# Patient Record
Sex: FEMALE | NOT HISPANIC OR LATINO | URBAN - METROPOLITAN AREA
[De-identification: names, ages, dates, MRNs, and addresses within clinical notes are randomized per-mention and may not be internally consistent; named-entity substitution may affect disease eponyms.]

---

## 2017-03-21 ENCOUNTER — EMERGENCY (EMERGENCY)
Facility: HOSPITAL | Age: 52
LOS: 1 days | Discharge: HOME | End: 2017-03-21
Attending: EMERGENCY MEDICINE | Admitting: EMERGENCY MEDICINE
Payer: COMMERCIAL

## 2017-03-21 VITALS
DIASTOLIC BLOOD PRESSURE: 64 MMHG | SYSTOLIC BLOOD PRESSURE: 107 MMHG | RESPIRATION RATE: 16 BRPM | HEART RATE: 102 BPM | OXYGEN SATURATION: 99 % | TEMPERATURE: 99 F

## 2017-03-21 VITALS
SYSTOLIC BLOOD PRESSURE: 131 MMHG | WEIGHT: 104.94 LBS | HEIGHT: 61 IN | RESPIRATION RATE: 16 BRPM | DIASTOLIC BLOOD PRESSURE: 84 MMHG | HEART RATE: 107 BPM | OXYGEN SATURATION: 100 % | TEMPERATURE: 99 F

## 2017-03-21 DIAGNOSIS — B97.81 HUMAN METAPNEUMOVIRUS AS THE CAUSE OF DISEASES CLASSIFIED ELSEWHERE: ICD-10-CM

## 2017-03-21 DIAGNOSIS — R06.02 SHORTNESS OF BREATH: ICD-10-CM

## 2017-03-21 PROCEDURE — 84702 CHORIONIC GONADOTROPIN TEST: CPT

## 2017-03-21 PROCEDURE — 83605 ASSAY OF LACTIC ACID: CPT

## 2017-03-21 PROCEDURE — 87040 BLOOD CULTURE FOR BACTERIA: CPT

## 2017-03-21 PROCEDURE — 87581 M.PNEUMON DNA AMP PROBE: CPT

## 2017-03-21 PROCEDURE — 99284 EMERGENCY DEPT VISIT MOD MDM: CPT

## 2017-03-21 PROCEDURE — 71010: CPT | Mod: 26

## 2017-03-21 PROCEDURE — 87486 CHLMYD PNEUM DNA AMP PROBE: CPT

## 2017-03-21 PROCEDURE — 99284 EMERGENCY DEPT VISIT MOD MDM: CPT | Mod: 25

## 2017-03-21 PROCEDURE — 80053 COMPREHEN METABOLIC PANEL: CPT

## 2017-03-21 PROCEDURE — 85027 COMPLETE CBC AUTOMATED: CPT

## 2017-03-21 PROCEDURE — 93005 ELECTROCARDIOGRAM TRACING: CPT

## 2017-03-21 PROCEDURE — 87633 RESP VIRUS 12-25 TARGETS: CPT

## 2017-03-21 PROCEDURE — 87798 DETECT AGENT NOS DNA AMP: CPT

## 2017-03-21 PROCEDURE — 94640 AIRWAY INHALATION TREATMENT: CPT

## 2017-03-21 PROCEDURE — 71045 X-RAY EXAM CHEST 1 VIEW: CPT

## 2017-03-21 RX ORDER — IBUPROFEN 200 MG
1 TABLET ORAL
Qty: 20 | Refills: 0 | OUTPATIENT
Start: 2017-03-21 | End: 2017-03-26

## 2017-03-21 RX ORDER — IPRATROPIUM/ALBUTEROL SULFATE 18-103MCG
3 AEROSOL WITH ADAPTER (GRAM) INHALATION ONCE
Qty: 0 | Refills: 0 | Status: COMPLETED | OUTPATIENT
Start: 2017-03-21 | End: 2017-03-21

## 2017-03-21 RX ORDER — SODIUM CHLORIDE 9 MG/ML
1000 INJECTION INTRAMUSCULAR; INTRAVENOUS; SUBCUTANEOUS ONCE
Qty: 0 | Refills: 0 | Status: COMPLETED | OUTPATIENT
Start: 2017-03-21 | End: 2017-03-21

## 2017-03-21 RX ORDER — ALBUTEROL 90 UG/1
2 AEROSOL, METERED ORAL
Qty: 1 | Refills: 0 | OUTPATIENT
Start: 2017-03-21 | End: 2017-03-31

## 2017-03-21 RX ADMIN — SODIUM CHLORIDE 1000 MILLILITER(S): 9 INJECTION INTRAMUSCULAR; INTRAVENOUS; SUBCUTANEOUS at 14:45

## 2017-03-21 RX ADMIN — Medication 3 MILLILITER(S): at 15:04

## 2017-03-21 NOTE — ED PROVIDER NOTE - OBJECTIVE STATEMENT
cough, sob, chills, chest tightness, body aches x 5 days, not sure if had fever.  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

## 2017-03-21 NOTE — ED PROVIDER NOTE - PROGRESS NOTE DETAILS
results discussed, copy of results given, rx fo albuterol, motrin, mucinex, sent to pharmacy.  follow up with pmd , return to ed if condition worsens

## 2017-03-21 NOTE — ED PROVIDER NOTE - ATTENDING CONTRIBUTION TO CARE
pt c/o 5 days of sob, productive cough, tactile fever, maylgias. no ha, cp, abd pain, d/n/v, edema, calf pain.  mmm, s1s2 rrr, lungs cta b/l, abd soft, non-tender, non-distended, skin warm and dry

## 2017-03-21 NOTE — ED PROVIDER NOTE - CARE PLAN
Principal Discharge DX:	Cough Principal Discharge DX:	Human metapneumovirus (hMPV) as cause of disease classified elsewhere

## 2017-03-21 NOTE — ED ADULT NURSE REASSESSMENT NOTE - NS ED NURSE REASSESS COMMENT FT1
Relief break RN.  Pt A&Ox4, NAD, ambulated to BR.  Fluids hung as ordered, pt started on neb treatment.

## 2017-03-21 NOTE — ED PROVIDER NOTE - CONSTITUTIONAL, MLM
normal... Weawake, alert, oriented to person, place, time/situation and in no apparent distress. Well developed, well nourished, awake, alert, oriented to person, place, time/situation and in no apparent distress.

## 2017-10-24 ENCOUNTER — RESULT REVIEW (OUTPATIENT)
Age: 52
End: 2017-10-24

## 2017-10-31 PROBLEM — Z00.00 ENCOUNTER FOR PREVENTIVE HEALTH EXAMINATION: Status: ACTIVE | Noted: 2017-10-31

## 2017-12-06 ENCOUNTER — OUTPATIENT (OUTPATIENT)
Dept: OUTPATIENT SERVICES | Facility: HOSPITAL | Age: 52
LOS: 1 days | End: 2017-12-06
Payer: COMMERCIAL

## 2017-12-06 ENCOUNTER — APPOINTMENT (OUTPATIENT)
Dept: MAMMOGRAPHY | Facility: IMAGING CENTER | Age: 52
End: 2017-12-06
Payer: COMMERCIAL

## 2017-12-06 ENCOUNTER — APPOINTMENT (OUTPATIENT)
Dept: ULTRASOUND IMAGING | Facility: IMAGING CENTER | Age: 52
End: 2017-12-06
Payer: COMMERCIAL

## 2017-12-06 DIAGNOSIS — Z00.8 ENCOUNTER FOR OTHER GENERAL EXAMINATION: ICD-10-CM

## 2017-12-06 PROCEDURE — 77063 BREAST TOMOSYNTHESIS BI: CPT

## 2017-12-06 PROCEDURE — 77063 BREAST TOMOSYNTHESIS BI: CPT | Mod: 26

## 2017-12-06 PROCEDURE — 76641 ULTRASOUND BREAST COMPLETE: CPT | Mod: 26,50

## 2017-12-06 PROCEDURE — 76641 ULTRASOUND BREAST COMPLETE: CPT

## 2017-12-06 PROCEDURE — G0202: CPT | Mod: 26

## 2017-12-06 PROCEDURE — 77067 SCR MAMMO BI INCL CAD: CPT

## 2017-12-30 ENCOUNTER — TRANSCRIPTION ENCOUNTER (OUTPATIENT)
Age: 52
End: 2017-12-30

## 2019-10-03 NOTE — ED PROVIDER NOTE - AGGRAVATING FACTORS
NOTIFICATION OF RETURN TO WORK / SCHOOL 
 
10/3/2019 Ms. Kieran Guillen [address] [id number] To Whom It May Concern: 
 
Kieran Guillen was under the care of [address] for recent illness. She will return to work on October 7, 2019 with no restrictions. If there are questions or concerns please have the patient contact our office.  
 
 
 
Sincerely, 
 
 
Cachorro Lawler, DO 
 none

## 2025-07-30 NOTE — ED ADULT TRIAGE NOTE - NS ED NURSE BANDS TYPE
End of Shift Note    Bedside shift change report given to YASMINE Vazquez (oncoming nurse) by Ky Jeffery RN (offgoing nurse).  Report included the following information SBAR, Intake/Output, MAR, Recent Results, Med Rec Status, Cardiac Rhythm Sinus Rhythm, Alarm Parameters , Quality Measures, and Dual Neuro Assessment    Shift worked: 7:00 PM-7:30 AM     Shift summary and any significant changes:    Patient had an uneventful shift. No labs were ordered. Progressively weaned the patient off of the Nick drip. Vital Signs Stable. MD Ghosh came to bedside and stated patient would most likely discharge today. No labs ordered.    Concerns for physician to address: Possible Discharge      Zone phone for oncoming shift:  N/A       Activity:  Level of Assistance: Minimal assist, patient does 75% or more  Number times ambulated in hallways past shift: 0  Number of times OOB to chair past shift: 2    Cardiac:   Cardiac Monitoring: Yes      Cardiac Rhythm: Sinus rhythm    Access:  Current line(s): PIV     Genitourinary:   Urinary Status: Voiding, Bathroom privileges    Respiratory:   O2 Device: None (Room air)  Chronic home O2 use?: NO  Incentive spirometer at bedside: YES    GI:  Last BM (including prior to admit): 07/28/25  Current diet:  ADULT ORAL NUTRITION SUPPLEMENT; Breakfast, Lunch, Dinner; Diabetic Oral Supplement  ADULT DIET; Regular; Strawberry supplements  Passing flatus: YES    Pain Management:   Patient states pain is manageable on current regimen: YES    Skin:  Manjinder Scale Score: 20  Interventions: Wound Offloading (Prevention Methods): Bed, pressure redistribution/air, Bed, pressure reduction mattress, Chair cushion, Elevate heels, Pillows, Repositioning, Turning    Patient Safety:  Fall Risk: Nursing Judgement-Fall Risk High(Add Comments): Yes  Fall Risk Interventions  Nursing Judgement-Fall Risk High(Add Comments): Yes  Toilet Every 2 Hours-In Advance of Need: Yes  Hourly Visual Checks: Awake, In bed  Fall  Name band;